# Patient Record
Sex: MALE | Race: WHITE
[De-identification: names, ages, dates, MRNs, and addresses within clinical notes are randomized per-mention and may not be internally consistent; named-entity substitution may affect disease eponyms.]

---

## 2018-03-23 ENCOUNTER — HOSPITAL ENCOUNTER (OUTPATIENT)
Dept: HOSPITAL 80 - BMCIMAGING | Age: 53
End: 2018-03-23
Attending: FAMILY MEDICINE
Payer: COMMERCIAL

## 2018-03-23 DIAGNOSIS — S02.40EA: Primary | ICD-10-CM

## 2018-03-26 ENCOUNTER — HOSPITAL ENCOUNTER (EMERGENCY)
Dept: HOSPITAL 80 - FED | Age: 53
Discharge: HOME | End: 2018-03-26
Payer: COMMERCIAL

## 2018-03-26 VITALS
SYSTOLIC BLOOD PRESSURE: 129 MMHG | RESPIRATION RATE: 12 BRPM | TEMPERATURE: 98.2 F | DIASTOLIC BLOOD PRESSURE: 76 MMHG | HEART RATE: 51 BPM

## 2018-03-26 VITALS — OXYGEN SATURATION: 97 %
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
## 2018-03-26 DIAGNOSIS — S09.90XA: Primary | ICD-10-CM

## 2018-03-26 DIAGNOSIS — S02.40EA: ICD-10-CM
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
## 2018-04-04 ENCOUNTER — HOSPITAL ENCOUNTER (OUTPATIENT)
Dept: HOSPITAL 80 - FSGY | Age: 53
Discharge: HOME | End: 2018-04-04
Payer: COMMERCIAL

## 2018-04-04 VITALS — RESPIRATION RATE: 14 BRPM

## 2018-04-04 VITALS — OXYGEN SATURATION: 94 % | SYSTOLIC BLOOD PRESSURE: 134 MMHG | TEMPERATURE: 97.5 F | DIASTOLIC BLOOD PRESSURE: 90 MMHG

## 2018-04-04 VITALS — HEART RATE: 54 BPM
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
## 2018-04-04 DIAGNOSIS — S02.40EA: Primary | ICD-10-CM

## 2018-04-04 PROCEDURE — 0NSM0ZZ REPOSITION RIGHT ZYGOMATIC BONE, OPEN APPROACH: ICD-10-PCS

## 2018-04-04 NOTE — POSTOPPROG
Post Op Note


Date of Operation: 04/04/18


Surgeon: Chet Dowling


Anesthesia: GET(General Endotracheal)


Pre-op Diagnosis: R Zygomatic arch fracture


Post-op Diagnosis: R Zygomatic arch fracture


Indication: R Zygomatic arch fracture


Procedure: OR R Zygomatic arch fracture


Findings: R Zygomatic arch fracture


Inf/Abcess present in the surg proc area at time of surgery?: No


Depth: Deep Incisional (Fascial)


EBL: Minimal
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
